# Patient Record
Sex: MALE | Race: WHITE | NOT HISPANIC OR LATINO | Employment: OTHER | URBAN - METROPOLITAN AREA
[De-identification: names, ages, dates, MRNs, and addresses within clinical notes are randomized per-mention and may not be internally consistent; named-entity substitution may affect disease eponyms.]

---

## 2017-11-01 PROBLEM — K35.80 ACUTE APPENDICITIS: Status: ACTIVE | Noted: 2017-11-01

## 2019-11-06 ENCOUNTER — TELEPHONE (OUTPATIENT)
Dept: UROLOGY | Facility: CLINIC | Age: 34
End: 2019-11-06

## 2019-11-06 DIAGNOSIS — E29.1 TESTICULAR FAILURE: Primary | ICD-10-CM

## 2019-11-06 PROBLEM — K35.80 ACUTE APPENDICITIS: Status: RESOLVED | Noted: 2017-11-01 | Resolved: 2019-11-06

## 2019-11-11 ENCOUNTER — TELEPHONE (OUTPATIENT)
Dept: UROLOGY | Facility: CLINIC | Age: 34
End: 2019-11-11

## 2019-11-11 NOTE — TELEPHONE ENCOUNTER
----- Message from Ly Laguna LPN sent at 11/8/2019  3:45 PM CST -----  Contact: Evelina (Wife) [phone]  Amalia I called this patient he said he came in Wed and could not be seen , can you call him on Monday can you see if he can get SA or do he need a appointment.  ----- Message -----  From: Judy Bajwa  Sent: 11/8/2019  10:49 AM CST  To: Sherrell Hoyt called to speak to someone regarding the patient's order for a semen analysis. The patient is requesting to have it done at [facility] in [place].  Please contact her to discuss further.

## 2019-11-12 ENCOUNTER — PATIENT MESSAGE (OUTPATIENT)
Dept: UROLOGY | Facility: CLINIC | Age: 34
End: 2019-11-12

## 2019-11-12 ENCOUNTER — TELEPHONE (OUTPATIENT)
Dept: UROLOGY | Facility: CLINIC | Age: 34
End: 2019-11-12

## 2019-11-12 NOTE — TELEPHONE ENCOUNTER
----- Message from Aruna Gillis MA sent at 11/12/2019  2:19 PM CST -----  Contact: [phone] (wife) thang   [phone] (wife) thang   They live on the [facility] and would like to know why can't the pt have his SA done at a location closer to where they live. She said her  called last week and was told it has to be done [place] but was not told why.

## 2019-12-11 ENCOUNTER — OFFICE VISIT (OUTPATIENT)
Dept: UROLOGY | Facility: CLINIC | Age: 34
End: 2019-12-11
Payer: COMMERCIAL

## 2019-12-11 VITALS
BODY MASS INDEX: 26.73 KG/M2 | DIASTOLIC BLOOD PRESSURE: 75 MMHG | SYSTOLIC BLOOD PRESSURE: 118 MMHG | WEIGHT: 190.94 LBS | HEART RATE: 68 BPM | HEIGHT: 71 IN

## 2019-12-11 DIAGNOSIS — E29.1 TESTICULAR FAILURE: Primary | ICD-10-CM

## 2019-12-11 PROCEDURE — 99999 PR PBB SHADOW E&M-EST. PATIENT-LVL III: CPT | Mod: PBBFAC,,, | Performed by: UROLOGY

## 2019-12-11 PROCEDURE — 99999 PR PBB SHADOW E&M-EST. PATIENT-LVL III: ICD-10-PCS | Mod: PBBFAC,,, | Performed by: UROLOGY

## 2019-12-11 PROCEDURE — 99204 PR OFFICE/OUTPT VISIT, NEW, LEVL IV, 45-59 MIN: ICD-10-PCS | Mod: S$GLB,,, | Performed by: UROLOGY

## 2019-12-11 PROCEDURE — 99204 OFFICE O/P NEW MOD 45 MIN: CPT | Mod: S$GLB,,, | Performed by: UROLOGY

## 2019-12-11 NOTE — PROGRESS NOTES
Chief Complaint:  Infertility    HPI:    Mr. Lainez is a 34 y.o.  male who has been  to his wife for the past 4.5 years. They have been trying to achieve a pregnancy for the past 1 years, but without success. 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 He denies a history of erectile dysfunction and ejaculatory problems.  On 7/9/15 he underwent bilateral varicocele correction.  He's been lost to follow up since then.  He did achieve a pregnancy after that correction which is why he didn't follow back up.    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     Lab Results   Component Value Date    TOTALTESTOST 706 11/06/2019    LABLH 2.8 11/06/2019    FSH 4.00 11/06/2019    ESTRADIOL <10 (A) 11/06/2019    PROLACTIN 6.9 11/06/2019       SA (PASTOR) 8/8/14--3.5 cc/30 million per cc/41%/2% (WHO)  SA (PASTOR) 10/15/15--4.5 cc/34 million per cc/25%/2% (WHO)  SA (PASTOR) 11/20/19--2.0 cc/16 million per cc/33%/2%  SA  (PASTOR) 11/25/19--2.3 cc/31 million per cc/11%/3%    FOR REVIEW FROM PREVIOUS:    Mr. Lainez is a 30 y.o.  male who has been  to his wife for the past 2.5 years. They have been trying to achieve a pregnancy for the past 2 years, but without success. 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 He denies a history of erectile dysfunction and ejaculatory problems.  On 7/9/15 he underwent bilateral varicocele correction.     Lab Results   Component Value Date    TOTALTESTOST 749 05/11/2015    LABLH 3.2 05/11/2015    FSH 4.00 05/11/2015    ESTRADIOL 20 05/11/2015    PROLACTIN 7.4 05/11/2015       SA PASTOR 8/8/14--3.5 cc/30 million per cc/41%/2% (WHO)  SA PASTOR 10/15/15--4.5 cc/34 million per cc/25%/2% (WHO)    He has achieved 0 pregnancies in the past.    Ms. Lainez is 26 years old. Her menses are irregular. She has not undergone prior hysterosalpingogram. She has achieved  "0 prior pregnancies.  She sees Dr. Draper.    The couple has not undergone prior intrauterine insemination procedures.    The couple has not undergone prior in-vitro fertilization procedures.    Js Lainez denies a history of exposure to harmful chemicals, toxins, and radiation.    No history of recent fevers greater than 101.5 degrees Farenheit.    No history of recent exposure to "wet heat."    No history of urological trauma or testicular torsion.    No history of prostatitis, epididymitis, and orchitis.    No history of post-pubertal mumps.    There is no known family history of fertility problems.    REVIEW OF SYSTEMS:     He denies any chest pain, sore throat,  headache, blurred vision, diabetes, fever, nausea, vomiting, chills, flank discomfort, abdominal pain, bleeding per rectum, cough, SOB, recent loss of consciousness, recent mental status changes, seizures, dizziness, or upper/lower extremity weakness.    PHYSICAL EXAM:     General appearance: Well-developed, well-nourished male in no apparent distress.    Skin: Normal.    Mental status: Alert and oriented. Cooperative with normal affect.    Neuro: Motor and sensory exams grossly intact.    HEENT: Normocephalic, atraumatic.    Neck: Normal. No evidence of lymphadenopathy. No thyroidomegaly.    Chest: Normal inspiratory effort.    Lungs: Clear to auscultation bilaterally.    Heart: Regular rate and rhythm. No murmurs.    Abdomen: Soft, non-distended, non-tender, no masses or organomegaly. Bowel sounds are normal. Bladder is not palpable. No evidence of inguinal hernia.    Back: No evidence of flank discomfort.    Genitourinary: Penis is normal with no evidence of plaques or induration. Urethral meatus is normal. Scrotum is normal. Testes are descended bilaterally with no evidence of abnormal masses or tenderness. Epididymis, vas deferens, and cord structures are normal bilaterally.  Testicular volume is approximately 18 cc on the L and 17 cc on the " "R.      Extremities: No cyanosis, clubbing, or edema.    IMPRESSION & PLAN:    Mr. Lainez is a 34 y.o.  male who has been  to his wife for the past 4.5 years. They have been trying to achieve a pregnancy for the past 1 years, but without success. 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 He denies a history of erectile dysfunction and ejaculatory problems.  On 7/9/15 he underwent bilateral varicocele correction.  He's been lost to follow up since then.  He did achieve a pregnancy after that correction which is why he didn't follow back up.    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     Lab Results   Component Value Date    TOTALTESTOST 706 11/06/2019    LABLH 2.8 11/06/2019    FSH 4.00 11/06/2019    ESTRADIOL <10 (A) 11/06/2019    PROLACTIN 6.9 11/06/2019       SA (PASTOR) 8/8/14--3.5 cc/30 million per cc/41%/2% (WHO)  SA (PASTOR) 10/15/15--4.5 cc/34 million per cc/25%/2% (WHO)  SA (PASTOR) 11/20/19--2.0 cc/16 million per cc/33%/2%  SA  (PASTOR) 11/25/19--2.3 cc/31 million per cc/11%/3%    1. Went over the results of his SA and hormonal panel today.    2.  From a male factor, efforts with IUI vs IVF are most appropriate.  3. Recommend avoiding "wet heat."  4. Recommend taking a multivitamin and 500 mg of vitamin c daily in addition to the multivitamin.  5. I spent 25 minutes with the patient of which more than half was spent in direct consultation with the patient in regards to our treatment and plan.     6.  Info on [facility] provided today.    CC:     "

## 2019-12-11 NOTE — LETTER
December 11, 2019      [place]  [address] [address] [address]           [facility] - Urology 4th Floor  [address]  [facility] [address]  Phone: [phone]          Patient: Js Lainez   MR Number: [id number]   YOB: 1985   Date of Visit: 12/11/2019       Dear Akira Kenny:    Thank you for referring Js Lainez to me for evaluation. Attached you will find relevant portions of my assessment and plan of care.    If you have questions, please do not hesitate to call me. I look forward to following Js Lainez along with you.    Sincerely,    Bubba Wynne MD    Enclosure  CC:  No Recipients    If you would like to receive this communication electronically, please contact [email or web address] or [phone] to request more information on [employer] Link access.    For providers and/or their staff who would like to refer a patient to [facility], please contact us through our one-stop-shop provider referral line, [facility], at [phone].    If you feel you have received this communication in error or would no longer like to receive these types of communications, please e-mail [email or web address]